# Patient Record
Sex: MALE | Race: WHITE
[De-identification: names, ages, dates, MRNs, and addresses within clinical notes are randomized per-mention and may not be internally consistent; named-entity substitution may affect disease eponyms.]

---

## 2017-05-05 ENCOUNTER — HOSPITAL ENCOUNTER (EMERGENCY)
Dept: HOSPITAL 10 - FTE | Age: 47
Discharge: HOME | End: 2017-05-05
Payer: MEDICAID

## 2017-05-05 VITALS
HEART RATE: 85 BPM | TEMPERATURE: 97.8 F | SYSTOLIC BLOOD PRESSURE: 161 MMHG | RESPIRATION RATE: 18 BRPM | DIASTOLIC BLOOD PRESSURE: 110 MMHG

## 2017-05-05 VITALS
WEIGHT: 251.33 LBS | BODY MASS INDEX: 41.87 KG/M2 | WEIGHT: 251.33 LBS | HEIGHT: 65 IN | HEIGHT: 65 IN | BODY MASS INDEX: 41.87 KG/M2

## 2017-05-05 DIAGNOSIS — K59.00: ICD-10-CM

## 2017-05-05 DIAGNOSIS — R11.0: ICD-10-CM

## 2017-05-05 DIAGNOSIS — Z87.891: ICD-10-CM

## 2017-05-05 DIAGNOSIS — R42: Primary | ICD-10-CM

## 2017-05-05 PROCEDURE — 99283 EMERGENCY DEPT VISIT LOW MDM: CPT

## 2017-05-05 NOTE — ERD
ER Documentation


Chief Complaint


Date/Time


DATE: 5/5/17 


TIME: 03:59


Chief Complaint


DIZZINESS, NAUSEA WITHOUT VOMITING AND CONSTIPATION X 2 DAYS





HPI


This pleasant 46-year-old male patient presents to the emergency room with 

complaint of dizziness, described as lightheadedness and nausea without 

vomiting.  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 

medication for nausea.  Patient reports he also had a sore throat at that time 

sore throat has resolved.  Patient has a secondary complaint of constipation 2 

days.  Patient normally has a bowel movement every days, reports hard stool, 

though fiber diet.





ROS


All systems reviewed and are negative except as per history of present illness.





PMhx/Soc


Medical and Surgical Hx:  pt denies Medical Hx, pt denies Surgical Hx


History of Surgery:  No (PT DENIES MEDICAL AND SURGICAL HX.)


Anesthesia Reaction:  No


Hx Neurological Disorder:  No


Hx Respiratory Disorders:  No


Hx Cardiac Disorders:  No


Hx Psychiatric Problems:  No


Hx Miscellaneous Medical Probl:  No


Hx Alcohol Use:  No


Hx Substance Use:  No


Hx Tobacco Use:  No (QUIT 8 YRS AGO.)


Smoking Status:  Never smoker





Physical Exam


Vitals





Vital Signs








  Date Time  Temp Pulse Resp B/P Pulse Ox O2 Delivery O2 Flow Rate FiO2


 


5/5/17 02:13 97.8 82 18 134/94 97   








Physical Exam


Const:     No acute


Head:   Atraumatic 


Eyes:    Normal Conjunctiva, PERRLA, EOMI


ENT:    Normal External Ears, Nose and Mouth.  Mucous membranes moist


Neck:               Full range of motion.


Resp:    Clear to auscultation bilaterally


Cardio:    Regular rate and rhythm, no murmurs


Abd:    Abdomen soft, nondistended, nontender to palpation


Skin:    


Back:    


Ext:    


Neur:   Neuro:


 M/S:                 Alert and oriented


 Face:               EOMI, face and pharynx with normal sensation and function


 Motor:            Normal strength throughout


 Sensation:      Normal sensation throughout


 Speech:       Normal


 Cerebel:         Normal coordination


                         Normal gait


                  


 DTR:              2+ and symmetric upper/lower extremities


Psych:    Normal Mood and Affect





Procedures/MDM


This pleasant 46-year-old male patient presents to emergency department with 

dizziness, lightheadedness, and nausea with constipation.  Patient was seen 3 

days ago for sore throat and nausea by primary care physician, patient reports 

that he was given medication for nausea, dizziness started yesterday.  Patient 

describes dizziness as lightheaded, denies spinning.  Patient reports 

constipation 2 days, patient reports he usually has bowel movement daily and 

has not gone in 2 days.  He reports a low fiber diet in a sedentary lifestyle.  

Patient was given Zofran in emergency department, will be discharged home with 

meclizine and MiraLAX, diet discussed increasing fiber, water, activity, return 

to emergency room for worsening of symptoms, nausea, vomiting not responding to 

medication.  I feel the patient is stable for discharge and outpatient 

management with primary care physician.  I have discussed results, examination 

findings, the treatment plan with the patient and family present prior to 

discharge.  Indications for emergent reevaluation, side effects of medication 

were also discussed.  All questions were answered.  Patient verbalizes 

understanding and agrees with plan of care.





Departure


Diagnosis:  


 Primary Impression:  


 Dizziness


 Additional Impression:  


 Constipation


 Constipation type:  unspecified constipation type  Qualified Code:  K59.00 - 

Constipation, unspecified constipation type


Condition:  Good


Patient Instructions:  Constipation (Adult), Dizziness (Vertigo) and Balance 

Problems: Ensuring Your Safety





Additional Instructions:  


Thank you for for coming to [facility] for your care today. 

Please ask your nurse or provider if you have questions about your care today 

and do not leave until all your questions have been answered.  Please use any 

medications given as directed and follow-up with your doctor (or the doctor you 

were referred to) in the next 2-3 days. If you do not have a primary care 

doctor you may follow up at the [facility] [place] (listed below). You may also 

use motrin and tylenol as needed for fever and/or pain unless instructed 

otherwise by your provider or nurse. Indications for more urgent follow-up have 

been discussed, but you may return to the Emergency Department at ANY time for 

any worrisome or worsening symptoms.





If you have abdominal pain, please know that no test or exam you received is 

perfect and you should follow up within 8 hours for continued pain.





If you had any imaging studies today, such as an X-Ray or CT Scan, these 

studies will be reviewed later by a radiologist. You will be called if there 

are important findings that were not identified today, so make sure the contact 

information you provided at registration is correct.





If you received any narcotic pain control medicine today, such as Vicodin, 

Morphine or Dilaudid, your coordination and judgment may be affected for a 

number of hours. Please do not drive or operate heavy machinery, and you may 

want someone to assist you at home. If you were given a prescription for 

narcotic medication, be aware that it is very addictive- use sparingly and only 

if necessary.











SANCHEZ SINGER May 5, 2017 04:05

## 2019-02-13 ENCOUNTER — HOSPITAL ENCOUNTER (EMERGENCY)
Dept: HOSPITAL 91 - E/R | Age: 49
LOS: 1 days | Discharge: HOME | End: 2019-02-14
Payer: MEDICAID

## 2019-02-13 DIAGNOSIS — R07.9: ICD-10-CM

## 2019-02-13 DIAGNOSIS — J06.9: Primary | ICD-10-CM

## 2019-02-13 DIAGNOSIS — Z79.891: ICD-10-CM

## 2019-02-13 PROCEDURE — 85610 PROTHROMBIN TIME: CPT

## 2019-02-13 PROCEDURE — 36415 COLL VENOUS BLD VENIPUNCTURE: CPT

## 2019-02-13 PROCEDURE — 99285 EMERGENCY DEPT VISIT HI MDM: CPT

## 2019-02-13 PROCEDURE — 71045 X-RAY EXAM CHEST 1 VIEW: CPT

## 2019-02-13 PROCEDURE — 85025 COMPLETE CBC W/AUTO DIFF WBC: CPT

## 2019-02-13 PROCEDURE — 84484 ASSAY OF TROPONIN QUANT: CPT

## 2019-02-13 PROCEDURE — 80053 COMPREHEN METABOLIC PANEL: CPT

## 2019-02-13 PROCEDURE — 93005 ELECTROCARDIOGRAM TRACING: CPT

## 2019-02-13 PROCEDURE — 85730 THROMBOPLASTIN TIME PARTIAL: CPT

## 2019-02-13 PROCEDURE — 83605 ASSAY OF LACTIC ACID: CPT

## 2019-02-13 PROCEDURE — 87040 BLOOD CULTURE FOR BACTERIA: CPT

## 2019-02-13 PROCEDURE — 87086 URINE CULTURE/COLONY COUNT: CPT

## 2019-02-13 PROCEDURE — 87400 INFLUENZA A/B EACH AG IA: CPT

## 2019-02-13 PROCEDURE — 81001 URINALYSIS AUTO W/SCOPE: CPT

## 2019-02-14 LAB
ADD MAN DIFF?: NO
ADD UMIC: YES
ALANINE AMINOTRANSFERASE: 49 IU/L (ref 13–69)
ALBUMIN/GLOBULIN RATIO: 1.2
ALBUMIN: 4.2 G/DL (ref 3.3–4.9)
ALKALINE PHOSPHATASE: 94 IU/L (ref 42–121)
ANION GAP: 11 (ref 5–13)
ASPARTATE AMINO TRANSFERASE: 26 IU/L (ref 15–46)
BASOPHIL #: 0.1 10^3/UL (ref 0–0.1)
BASOPHILS %: 0.4 % (ref 0–2)
BILIRUBIN,DIRECT: 0 MG/DL (ref 0–0.2)
BILIRUBIN,TOTAL: 0.3 MG/DL (ref 0.2–1.3)
BLOOD UREA NITROGEN: 15 MG/DL (ref 7–20)
CALCIUM: 9.3 MG/DL (ref 8.4–10.2)
CARBON DIOXIDE: 28 MMOL/L (ref 21–31)
CHLORIDE: 100 MMOL/L (ref 97–110)
CREATININE: 0.72 MG/DL (ref 0.61–1.24)
EOSINOPHILS #: 0.4 10^3/UL (ref 0–0.5)
EOSINOPHILS %: 3.1 % (ref 0–7)
GLOBULIN: 3.5 G/DL (ref 1.3–3.2)
GLUCOSE: 119 MG/DL (ref 70–220)
HEMATOCRIT: 41.6 % (ref 42–52)
HEMOGLOBIN: 14.1 G/DL (ref 14–18)
IMMATURE GRANS #M: 0.12 10^3/UL (ref 0–0.03)
IMMATURE GRANS % (M): 0.9 % (ref 0–0.43)
INR: 0.92
LYMPHOCYTES #: 2.5 10^3/UL (ref 0.8–2.9)
LYMPHOCYTES %: 18.4 % (ref 15–51)
MEAN CORPUSCULAR HEMOGLOBIN: 28.5 PG (ref 29–33)
MEAN CORPUSCULAR HGB CONC: 33.9 G/DL (ref 32–37)
MEAN CORPUSCULAR VOLUME: 84.2 FL (ref 82–101)
MEAN PLATELET VOLUME: 9.8 FL (ref 7.4–10.4)
MONOCYTE #: 1.3 10^3/UL (ref 0.3–0.9)
MONOCYTES %: 9.6 % (ref 0–11)
NEUTROPHIL #: 9.3 10^3/UL (ref 1.6–7.5)
NEUTROPHILS %: 67.6 % (ref 39–77)
NUCLEATED RED BLOOD CELLS #: 0 10^3/UL (ref 0–0)
NUCLEATED RED BLOOD CELLS%: 0 /100WBC (ref 0–0)
PARTIAL THROMBOPLASTIN TIME: 36 SEC (ref 23–35)
PLATELET COUNT: 307 10^3/UL (ref 140–415)
POTASSIUM: 3.2 MMOL/L (ref 3.5–5.1)
PROTIME: 12.5 SEC (ref 11.9–14.9)
PT RATIO: 1
RED BLOOD COUNT: 4.94 10^6/UL (ref 4.7–6.1)
RED CELL DISTRIBUTION WIDTH: 12.4 % (ref 11.5–14.5)
SODIUM: 139 MMOL/L (ref 135–144)
TOTAL PROTEIN: 7.7 G/DL (ref 6.1–8.1)
TROPONIN-I: < 0.012 NG/ML (ref 0–0.12)
UR ASCORBIC ACID: NEGATIVE MG/DL
UR BILIRUBIN (DIP): NEGATIVE MG/DL
UR BLOOD (DIP): (no result) MG/DL
UR CLARITY: (no result)
UR COLOR: YELLOW
UR GLUCOSE (DIP): NEGATIVE MG/DL
UR KETONES (DIP): NEGATIVE MG/DL
UR LEUKOCYTE ESTERASE (DIP): NEGATIVE LEU/UL
UR MUCUS: (no result) /HPF
UR NITRITE (DIP): NEGATIVE MG/DL
UR PH (DIP): 5 (ref 5–9)
UR RBC: 1 /HPF (ref 0–5)
UR SPECIFIC GRAVITY (DIP): 1.02 (ref 1–1.03)
UR TOTAL PROTEIN (DIP): NEGATIVE MG/DL
UR UROBILINOGEN (DIP): (no result) MG/DL
UR WBC: 1 /HPF (ref 0–5)
WHITE BLOOD COUNT: 13.8 10^3/UL (ref 4.8–10.8)